# Patient Record
Sex: MALE | Race: BLACK OR AFRICAN AMERICAN
[De-identification: names, ages, dates, MRNs, and addresses within clinical notes are randomized per-mention and may not be internally consistent; named-entity substitution may affect disease eponyms.]

---

## 2017-11-21 ENCOUNTER — HOSPITAL ENCOUNTER (INPATIENT)
Dept: HOSPITAL 92 - ERS | Age: 42
LOS: 1 days | Discharge: HOME | DRG: 191 | End: 2017-11-22
Attending: FAMILY MEDICINE | Admitting: FAMILY MEDICINE
Payer: COMMERCIAL

## 2017-11-21 VITALS — BODY MASS INDEX: 20.7 KG/M2

## 2017-11-21 DIAGNOSIS — F19.10: ICD-10-CM

## 2017-11-21 DIAGNOSIS — F41.0: ICD-10-CM

## 2017-11-21 DIAGNOSIS — Z87.891: ICD-10-CM

## 2017-11-21 DIAGNOSIS — F31.9: ICD-10-CM

## 2017-11-21 DIAGNOSIS — J44.9: Primary | ICD-10-CM

## 2017-11-21 DIAGNOSIS — J45.41: ICD-10-CM

## 2017-11-21 LAB
ALP SERPL-CCNC: 40 U/L (ref 40–150)
ALT SERPL W P-5'-P-CCNC: 10 U/L (ref 8–55)
ANION GAP SERPL CALC-SCNC: 12 MMOL/L (ref 10–20)
AST SERPL-CCNC: 17 U/L (ref 5–34)
BASOPHILS # BLD AUTO: 0 THOU/UL (ref 0–0.2)
BASOPHILS NFR BLD AUTO: 0.1 % (ref 0–1)
BILIRUB SERPL-MCNC: 0.2 MG/DL (ref 0.2–1.2)
BUN SERPL-MCNC: 9 MG/DL (ref 8.9–20.6)
CALCIUM SERPL-MCNC: 9 MG/DL (ref 7.8–10.44)
CHLORIDE SERPL-SCNC: 108 MMOL/L (ref 98–107)
CO2 SERPL-SCNC: 23 MMOL/L (ref 22–29)
CREAT CL PREDICTED SERPL C-G-VRATE: 0 ML/MIN (ref 70–130)
EOSINOPHIL # BLD AUTO: 0.5 THOU/UL (ref 0–0.7)
EOSINOPHIL NFR BLD AUTO: 5.2 % (ref 0–10)
GLOBULIN SER CALC-MCNC: 2.8 G/DL (ref 2.4–3.5)
HCT VFR BLD CALC: 46.4 % (ref 42–52)
LYMPHOCYTES # BLD: 2.5 THOU/UL (ref 1.2–3.4)
LYMPHOCYTES NFR BLD AUTO: 26.4 % (ref 21–51)
MODIFIED ALLEN'S TEST: POSITIVE
MONOCYTES # BLD AUTO: 1 THOU/UL (ref 0.11–0.59)
MONOCYTES NFR BLD AUTO: 10.9 % (ref 0–10)
NEUTROPHILS # BLD AUTO: 5.5 THOU/UL (ref 1.4–6.5)
RBC # BLD AUTO: 5.67 MILL/UL (ref 4.7–6.1)
SODIUM SERPL-SCNC: 141 MMOL/L (ref 135–148)
TROPONIN I SERPL DL<=0.01 NG/ML-MCNC: (no result) NG/ML (ref ?–0.03)
VENT: NO
WBC # BLD AUTO: 9.5 THOU/UL (ref 4.8–10.8)

## 2017-11-21 PROCEDURE — 82805 BLOOD GASES W/O2 SATURATION: CPT

## 2017-11-21 PROCEDURE — 36415 COLL VENOUS BLD VENIPUNCTURE: CPT

## 2017-11-21 PROCEDURE — A4216 STERILE WATER/SALINE, 10 ML: HCPCS

## 2017-11-21 PROCEDURE — 96374 THER/PROPH/DIAG INJ IV PUSH: CPT

## 2017-11-21 PROCEDURE — 94664 DEMO&/EVAL PT USE INHALER: CPT

## 2017-11-21 PROCEDURE — 96372 THER/PROPH/DIAG INJ SC/IM: CPT

## 2017-11-21 PROCEDURE — 80306 DRUG TEST PRSMV INSTRMNT: CPT

## 2017-11-21 PROCEDURE — 80053 COMPREHEN METABOLIC PANEL: CPT

## 2017-11-21 PROCEDURE — 94644 CONT INHLJ TX 1ST HOUR: CPT

## 2017-11-21 PROCEDURE — 71010: CPT

## 2017-11-21 PROCEDURE — 85025 COMPLETE CBC W/AUTO DIFF WBC: CPT

## 2017-11-21 PROCEDURE — 82553 CREATINE MB FRACTION: CPT

## 2017-11-21 PROCEDURE — 84484 ASSAY OF TROPONIN QUANT: CPT

## 2017-11-21 PROCEDURE — 94760 N-INVAS EAR/PLS OXIMETRY 1: CPT

## 2017-11-21 PROCEDURE — 93005 ELECTROCARDIOGRAM TRACING: CPT

## 2017-11-21 PROCEDURE — 94640 AIRWAY INHALATION TREATMENT: CPT

## 2017-11-21 PROCEDURE — 96361 HYDRATE IV INFUSION ADD-ON: CPT

## 2017-11-21 RX ADMIN — MOMETASONE FUROATE AND FORMOTEROL FUMARATE DIHYDRATE SCH PUFF: 200; 5 AEROSOL RESPIRATORY (INHALATION) at 18:46

## 2017-11-21 NOTE — RAD
PORTABLE AP CHEST XRAY:

 

DATE: 11/21/17.

 

HISTORY: 

Dyspnea.  Asthma exacerbation.

 

COMPARISON: 

9/4/17.

 

FINDINGS: 

Cardiac silhouette and pulmonary vasculature are within normal limits.  Lungs are hyperexpanded but r
emain clear.  There has been no interval change from the prior exam.

 

IMPRESSION: 

1.  No acute cardiopulmonary process.

2.  Stable hyperexpansion of the lungs.

 

POS: [facility]

## 2017-11-22 VITALS — DIASTOLIC BLOOD PRESSURE: 68 MMHG | SYSTOLIC BLOOD PRESSURE: 116 MMHG

## 2017-11-22 VITALS — TEMPERATURE: 98.6 F

## 2017-11-22 RX ADMIN — MOMETASONE FUROATE AND FORMOTEROL FUMARATE DIHYDRATE SCH PUFF: 200; 5 AEROSOL RESPIRATORY (INHALATION) at 06:33

## 2017-11-22 NOTE — DIS-2
ADMITTING ATTENDING:  Molly Ackerman M.D.

 

DISCHARGE ATTENDING:  Ban Nascimento M.D.

 

DISCHARGING RESIDENT:  Amrita Nguyen DO

 

DISCHARGE DIAGNOSES:

1.  Acute asthma exacerbation.

2.  Polysubstance abuse.

3.  Possible chronic obstructive pulmonary disease.

4.  Prior tobacco abuse.

 

DISCHARGE MEDICATIONS:

1.  Continued medications.

2.  DuoNebs 3 mL nebulized q.i.d. p.r.n. wheezing.

3.  Montelukast 10 mg p.o. daily.

4.  ProAir 1 puff inhaled q.4 h. p.r.n. shortness of breath or wheeze.

5.  Triamcinolone cream applied topically twice daily p.r.n. rash.

 

Patient also endorses at some point taking Serevent and Flovent daily.

 

NEW MEDICATIONS:

1.  Breo Ellipta one inhaled daily.

2.  Claritin 10 mg p.o. daily.

3.  Prednisone 40 mg p.o. q.a.m. x3 days further.

 

HISTORY OF PRESENT ILLNESS AND HOSPITAL COURSE:  The patient is a 42-year-old -American male w
ith past medical history of childhood asthma, eczema and allergies as well as a significant history o
f tobacco abuse, but has since quit.  The patient also endorses daily marijuana use.  He presented to
 [facility] via EMS after acute onset of shortness of breath on the day of adm
ission which was 11/21/2017.  The patient states he woke up in his normal state of health and quickly
 developed acute shortness of breath and wheezing and called 911.  The patient was given 3 DuoNebs an
d 125 of Solu-Medrol en route with minimal relief, but never became hypoxic.  He also endorsed a sign
ificant history for prior intubations due to asthma exacerbation with last time being 9 months prior 
to arrival.  Upon admission, the patient was found to have clear lungs with poor air movement in the 
bases.  He was tachypneic, but otherwise, satting 196% on room air and responded well to conservative
 therapy.  The patient was admitted to the medical unit and nebulizers as well as steroids were juana
nued for improvement.  UDS was performed which was positive for methamphetamines as well as cannabino
ids, and patient endorses daily marijuana use to help his nerves.  On day of discharge, the patient w
as feeling better and desired to go home.  Recommended that the patient begin a combination inhaler w
ith a LABA and inhale corticosteroid given his persistent symptoms.  We also recommended the patient 
stop using street drugs in an effort to improve his respiratory status.  With the image of his chest 
x-ray during his admission of hyperinflated lungs with flattened diaphragms, there is also some consi
deration for obstructive disease and recommend outpatient PFTs to rule out a combination of chronic o
bstructive pulmonary disease and asthma.

 

DISPOSITION:  Stable.

 

DISCHARGE INSTRUCTIONS:

1.  Location:  Home.

2.  Diet:  Regular.

3.  Activity:  As tolerated with cardiopulmonary limitations.

 

RECOMMENDATIONS:  Marijuana and methamphetamine cessation with another PCP followup.

 

FOLLOWUP:  Follow up with primary care physician, Dr. Choco Curiel within 1 week of discharge.

## 2017-11-22 NOTE — PDOC.FM
- Subjective


Subjective: 


Pt feeling well and wanting to go home at this time. denies any wheezing or SOB.








- Objective


MAR Reviewed: Yes


Vital Signs & Weight: 


 Vital Signs (12 hours)











  Temp Pulse Resp BP Pulse Ox


 


 11/22/17 08:00  98.6 F  78  16  116/68  99


 


 11/22/17 06:33   90  14  


 


 11/22/17 04:00  98.6 F  90  16  131/69  98


 


 11/22/17 02:38      98


 


 11/22/17 00:59   97  16   98


 


 11/22/17 00:00  98.8 F  91  16  109/63  97








 Weight











Weight                         65.771 kg














I&O: 


 











 11/21/17 11/22/17 11/23/17





 06:59 06:59 06:59


 


Intake Total  840 


 


Output Total  400 


 


Balance  440 











Result Diagrams: 


 11/21/17 09:15





 11/21/17 09:15





<Amrita Nguyen - Last Filed: 11/22/17 11:45>





- Objective


Vital Signs & Weight: 


 Vital Signs (12 hours)











  Temp Pulse Resp BP Pulse Ox


 


 11/22/17 08:00  98.6 F  78  16  116/68  99








 Weight











Weight                         65.771 kg














I&O: 


 











 11/21/17 11/22/17 11/23/17





 06:59 06:59 06:59


 


Intake Total  840 


 


Output Total  400 


 


Balance  440 











Result Diagrams: 


 11/21/17 09:15





 11/21/17 09:15





<Ban Nascimento - Last Filed: 11/22/17 19:25>





Phys Exam





- Physical Examination


Constitutional: NAD


HEENT: PERRLA, moist MMs, oral pharynx no lesions


Neck: no nodes, no JVD, supple


faint end exp wheezes


Cardiovascular: RRR, no significant murmur


Gastrointestinal: soft, non-tender


Musculoskeletal: no edema


Neurological: non-focal


Psychiatric: normal affect, A&O x 3





<Amrita Nguyen - Last Filed: 11/22/17 11:45>





Dx/Plan


(1) Asthma exacerbation


Code(s): J45.901 - UNSPECIFIED ASTHMA WITH (ACUTE) EXACERBATION   Status: Acute

   


  QualifierTitle:    Asthma severity: moderate   Asthma persistence: persistent

   Qualified Code(s): J45.41 - Moderate persistent asthma with (acute) 

exacerbation   


Plan: 


By history, pt moderate persistent asthma with acute exacerbation. Etiology 

likely multifactorial due to allergic or environmental causes and recent meth 

and MJ use. Will send rx for Breo to pharmacy for daily combo LABA + ICS use. 

Continue prednisone x 3 more days and f/u outpt with PCP. Recommend outpt PFTs 

to determine if any COPD component given prior tobacco use, and polysubtance 

abuse.








(2) Polysubstance (excluding opioids) dependence


Code(s): F19.20 - OTHER PSYCHOACTIVE SUBSTANCE DEPENDENCE, UNCOMPLICATED   

Status: Chronic   


Plan: 


counseled regarding cessation








- Plan


Plan: 





stable for discharge home today with outpt PCP follow up.





<Amrita Nguyen - Last Filed: 11/22/17 11:45>





Attending Addendum





- Attending Addendum


I personally evaluated the patient and discussed the management with Dr. Nguyen.


I agree with the History, Examination, Assessment and Plan documented above 

with any addition or exceptions noted below.


The patient's breathing is back to baseline.  He has been counseled on drug 

abuse.  Will be changed to Breo inhaler, steroids.





<Ban Nascimento - Last Filed: 11/22/17 19:25>

## 2017-12-18 ENCOUNTER — HOSPITAL ENCOUNTER (EMERGENCY)
Dept: HOSPITAL 92 - ERS | Age: 42
Discharge: HOME | End: 2017-12-18
Payer: COMMERCIAL

## 2017-12-18 DIAGNOSIS — Z87.891: ICD-10-CM

## 2017-12-18 DIAGNOSIS — J44.1: ICD-10-CM

## 2017-12-18 DIAGNOSIS — Z79.899: ICD-10-CM

## 2017-12-18 DIAGNOSIS — J18.9: Primary | ICD-10-CM

## 2017-12-18 LAB
ALP SERPL-CCNC: 32 U/L (ref 40–150)
ALT SERPL W P-5'-P-CCNC: 11 U/L (ref 8–55)
ANION GAP SERPL CALC-SCNC: 14 MMOL/L (ref 10–20)
AST SERPL-CCNC: 15 U/L (ref 5–34)
BASOPHILS # BLD AUTO: 0 THOU/UL (ref 0–0.2)
BASOPHILS NFR BLD AUTO: 0.9 % (ref 0–1)
BILIRUB SERPL-MCNC: 0.4 MG/DL (ref 0.2–1.2)
BUN SERPL-MCNC: 10 MG/DL (ref 8.9–20.6)
CALCIUM SERPL-MCNC: 8.9 MG/DL (ref 7.8–10.44)
CHLORIDE SERPL-SCNC: 105 MMOL/L (ref 98–107)
CO2 SERPL-SCNC: 23 MMOL/L (ref 22–29)
CREAT CL PREDICTED SERPL C-G-VRATE: 0 ML/MIN (ref 70–130)
EOSINOPHIL # BLD AUTO: 0 THOU/UL (ref 0–0.7)
EOSINOPHIL NFR BLD AUTO: 0.9 % (ref 0–10)
GLOBULIN SER CALC-MCNC: 2.5 G/DL (ref 2.4–3.5)
HCT VFR BLD CALC: 41.3 % (ref 42–52)
LACTATE SERPL-SCNC: 1.6 MMOL/L (ref 0.5–2.2)
LYMPHOCYTES # BLD: 1 THOU/UL (ref 1.2–3.4)
LYMPHOCYTES NFR BLD AUTO: 18.7 % (ref 21–51)
MONOCYTES # BLD AUTO: 0.8 THOU/UL (ref 0.11–0.59)
MONOCYTES NFR BLD AUTO: 14.5 % (ref 0–10)
NEUTROPHILS # BLD AUTO: 3.4 THOU/UL (ref 1.4–6.5)
RBC # BLD AUTO: 5.09 MILL/UL (ref 4.7–6.1)
WBC # BLD AUTO: 5.3 THOU/UL (ref 4.8–10.8)

## 2017-12-18 PROCEDURE — 80053 COMPREHEN METABOLIC PANEL: CPT

## 2017-12-18 PROCEDURE — 85025 COMPLETE CBC W/AUTO DIFF WBC: CPT

## 2017-12-18 PROCEDURE — 96374 THER/PROPH/DIAG INJ IV PUSH: CPT

## 2017-12-18 PROCEDURE — 71020: CPT

## 2017-12-18 PROCEDURE — 87149 DNA/RNA DIRECT PROBE: CPT

## 2017-12-18 PROCEDURE — 83605 ASSAY OF LACTIC ACID: CPT

## 2017-12-18 PROCEDURE — 94640 AIRWAY INHALATION TREATMENT: CPT

## 2017-12-18 PROCEDURE — 96361 HYDRATE IV INFUSION ADD-ON: CPT

## 2017-12-18 PROCEDURE — 87040 BLOOD CULTURE FOR BACTERIA: CPT

## 2017-12-18 PROCEDURE — 36415 COLL VENOUS BLD VENIPUNCTURE: CPT

## 2017-12-18 NOTE — RAD
TWO VIEWS OF THE CHEST:

12/18/2017

 

COMPARISON:

10/06/2006

 

HISTORY:

Cough and chills.

 

FINDINGS:

The lungs are hyperinflated, a stable finding.  There is nonspecific interstitial linear density in b
ilateral perihilar regions in both upper lobes, stable.  No pneumothorax or pleural fluid is seen.  T
here is no lobar consolidation or alveolar edema.

 

IMPRESSION:   

Hyperinflated lungs with increased linear interstitial density.  Question a history of COPD.  No foca
l consolidation or alveolar edema.

 

POS: [facility]

## 2018-04-19 ENCOUNTER — HOSPITAL ENCOUNTER (EMERGENCY)
Dept: HOSPITAL 92 - ERS | Age: 43
Discharge: HOME | End: 2018-04-19
Payer: SELF-PAY

## 2018-04-19 DIAGNOSIS — J45.909: ICD-10-CM

## 2018-04-19 DIAGNOSIS — R07.89: Primary | ICD-10-CM

## 2018-04-19 DIAGNOSIS — Z87.891: ICD-10-CM

## 2018-04-19 DIAGNOSIS — Z79.899: ICD-10-CM

## 2018-04-19 LAB
ALBUMIN SERPL BCG-MCNC: 4.8 G/DL (ref 3.5–5)
ALP SERPL-CCNC: 39 U/L (ref 40–150)
ALT SERPL W P-5'-P-CCNC: 14 U/L (ref 8–55)
ANION GAP SERPL CALC-SCNC: 17 MMOL/L (ref 10–20)
AST SERPL-CCNC: 19 U/L (ref 5–34)
BASOPHILS # BLD AUTO: 0 THOU/UL (ref 0–0.2)
BASOPHILS NFR BLD AUTO: 0.1 % (ref 0–1)
BILIRUB SERPL-MCNC: 0.4 MG/DL (ref 0.2–1.2)
BUN SERPL-MCNC: 15 MG/DL (ref 8.9–20.6)
CALCIUM SERPL-MCNC: 10.3 MG/DL (ref 7.8–10.44)
CHLORIDE SERPL-SCNC: 102 MMOL/L (ref 98–107)
CK MB SERPL-MCNC: 1.6 NG/ML (ref 0–6.6)
CK SERPL-CCNC: 583 U/L (ref 30–200)
CO2 SERPL-SCNC: 23 MMOL/L (ref 22–29)
CREAT CL PREDICTED SERPL C-G-VRATE: 0 ML/MIN (ref 70–130)
EOSINOPHIL # BLD AUTO: 0 THOU/UL (ref 0–0.7)
EOSINOPHIL NFR BLD AUTO: 0.2 % (ref 0–10)
GLOBULIN SER CALC-MCNC: 2.9 G/DL (ref 2.4–3.5)
GLUCOSE SERPL-MCNC: 114 MG/DL (ref 70–105)
HGB BLD-MCNC: 12.7 G/DL (ref 14–18)
LYMPHOCYTES # BLD: 0.7 THOU/UL (ref 1.2–3.4)
LYMPHOCYTES NFR BLD AUTO: 12.4 % (ref 21–51)
MCH RBC QN AUTO: 25.4 PG (ref 27–31)
MCV RBC AUTO: 80.9 FL (ref 80–94)
MONOCYTES # BLD AUTO: 0.1 THOU/UL (ref 0.11–0.59)
MONOCYTES NFR BLD AUTO: 1.5 % (ref 0–10)
NEUTROPHILS # BLD AUTO: 4.8 THOU/UL (ref 1.4–6.5)
NEUTROPHILS NFR BLD AUTO: 85.9 % (ref 42–75)
PLATELET # BLD AUTO: 317 THOU/UL (ref 130–400)
POTASSIUM SERPL-SCNC: 4.6 MMOL/L (ref 3.5–5.1)
RBC # BLD AUTO: 4.99 MILL/UL (ref 4.7–6.1)
SODIUM SERPL-SCNC: 137 MMOL/L (ref 136–145)
TROPONIN I SERPL DL<=0.01 NG/ML-MCNC: (no result) NG/ML (ref ?–0.03)
WBC # BLD AUTO: 5.6 THOU/UL (ref 4.8–10.8)

## 2018-04-19 PROCEDURE — 82553 CREATINE MB FRACTION: CPT

## 2018-04-19 PROCEDURE — 71045 X-RAY EXAM CHEST 1 VIEW: CPT

## 2018-04-19 PROCEDURE — 84484 ASSAY OF TROPONIN QUANT: CPT

## 2018-04-19 PROCEDURE — 93005 ELECTROCARDIOGRAM TRACING: CPT

## 2018-04-19 PROCEDURE — 80053 COMPREHEN METABOLIC PANEL: CPT

## 2018-04-19 PROCEDURE — 36415 COLL VENOUS BLD VENIPUNCTURE: CPT

## 2018-04-19 PROCEDURE — 85025 COMPLETE CBC W/AUTO DIFF WBC: CPT

## 2018-04-19 NOTE — RAD
CHEST ONE VIEW:

 

HISTORY:

A 42-year-old male with a history of chest pain and asthma exacerbation.

 

COMPARISON:

11/21/2017

 

FINDINGS:

Heart size is within normal limits.  Lungs are clear.  Monitor leads overly the chest.  No pneumonia,
 edema, or pleural effusion.

 

IMPRESSION:

No acute intrathoracic disease.

 

POS: [facility]

## 2018-05-14 ENCOUNTER — HOSPITAL ENCOUNTER (EMERGENCY)
Dept: HOSPITAL 92 - ERS | Age: 43
Discharge: HOME | End: 2018-05-14
Payer: COMMERCIAL

## 2018-05-14 DIAGNOSIS — J45.901: Primary | ICD-10-CM

## 2018-05-14 DIAGNOSIS — R19.7: ICD-10-CM

## 2018-05-14 DIAGNOSIS — Z87.891: ICD-10-CM

## 2018-05-14 DIAGNOSIS — B34.9: ICD-10-CM

## 2018-05-14 DIAGNOSIS — J44.9: ICD-10-CM

## 2018-05-14 PROCEDURE — 96372 THER/PROPH/DIAG INJ SC/IM: CPT

## 2018-05-14 PROCEDURE — 94640 AIRWAY INHALATION TREATMENT: CPT

## 2018-05-14 PROCEDURE — 71046 X-RAY EXAM CHEST 2 VIEWS: CPT

## 2018-05-14 PROCEDURE — 87081 CULTURE SCREEN ONLY: CPT

## 2018-05-14 PROCEDURE — 87430 STREP A AG IA: CPT

## 2018-05-14 NOTE — RAD
CHEST TWO VIEWS:

 

HISTORY:

Sore throat.  Cough.

 

COMPARISON:

Chest radiograph from 12/18/2017.

 

FINDINGS:

There are some punctate nodules within the periphery of the right middle lobe and of the lingula.  No
 focal air space consolidation, pneumothorax, or effusion.  The cardiac silhouette and mediastinal co
ntour is within normal limits.  No acute osseous abnormality.

 

IMPRESSION:

Punctate peripheral nodules, may be sequela of scarring or pulmonary vessels seen enface.  No focal c
onsolidation.

 

POS: [facility]

## 2020-02-15 ENCOUNTER — HOSPITAL ENCOUNTER (EMERGENCY)
Dept: HOSPITAL 92 - ERS | Age: 45
Discharge: HOME | End: 2020-02-15
Payer: COMMERCIAL

## 2020-02-15 DIAGNOSIS — Z87.891: ICD-10-CM

## 2020-02-15 DIAGNOSIS — J44.1: Primary | ICD-10-CM

## 2020-02-15 DIAGNOSIS — Z79.51: ICD-10-CM

## 2020-02-15 PROCEDURE — 93005 ELECTROCARDIOGRAM TRACING: CPT

## 2020-02-15 PROCEDURE — 94640 AIRWAY INHALATION TREATMENT: CPT
